# Patient Record
Sex: FEMALE | Race: WHITE | Employment: FULL TIME | URBAN - METROPOLITAN AREA
[De-identification: names, ages, dates, MRNs, and addresses within clinical notes are randomized per-mention and may not be internally consistent; named-entity substitution may affect disease eponyms.]

---

## 2017-08-02 ENCOUNTER — OFFICE VISIT (OUTPATIENT)
Dept: SURGERY | Age: 57
End: 2017-08-02

## 2017-08-02 VITALS
OXYGEN SATURATION: 98 % | RESPIRATION RATE: 14 BRPM | BODY MASS INDEX: 27.66 KG/M2 | SYSTOLIC BLOOD PRESSURE: 116 MMHG | DIASTOLIC BLOOD PRESSURE: 76 MMHG | WEIGHT: 166 LBS | HEART RATE: 78 BPM | HEIGHT: 65 IN | TEMPERATURE: 97.8 F

## 2017-08-02 DIAGNOSIS — R93.89 ENDOMETRIAL THICKENING ON ULTRA SOUND: Primary | ICD-10-CM

## 2017-08-02 DIAGNOSIS — N95.9 MENOPAUSAL AND POSTMENOPAUSAL DISORDER: ICD-10-CM

## 2017-08-02 DIAGNOSIS — N83.201 RIGHT OVARIAN CYST: ICD-10-CM

## 2017-08-02 NOTE — MR AVS SNAPSHOT
Visit Information Date & Time Provider Department Dept. Phone Encounter #  
 8/2/2017  2:30 PM Melvin Bernal, [address] [facility] [phone] [id number] Follow-up Instructions Return if symptoms worsen or fail to improve. Upcoming Health Maintenance Date Due Hepatitis C Screening 1960 DTaP/Tdap/Td series (1 - Tdap) 12/25/1983 PAP AKA CERVICAL CYTOLOGY 12/25/1983 BREAST CANCER SCRN MAMMOGRAM 12/25/2012 FOBT Q 1 YEAR AGE 50-75 12/25/2012 INFLUENZA AGE 9 TO ADULT 8/1/2017 Allergies as of 8/2/2017  Review Complete On: 8/2/2017 By: Melvin Bernal MD  
  
 Severity Noted Reaction Type Reactions Pcn [Penicillins]  08/02/2017    Rash Current Immunizations  Never Reviewed No immunizations on file. Not reviewed this visit You Were Diagnosed With   
  
 Codes Comments Endometrial thickening on ultra sound    -  Primary ICD-10-CM: R93.8 ICD-9-CM: 793.5 Right ovarian cyst     ICD-10-CM: L64.588 ICD-9-CM: 620.2 Menopausal and postmenopausal disorder     ICD-10-CM: N95.9 ICD-9-CM: 627.9 Vitals BP Pulse Temp Resp Height(growth percentile) Weight(growth percentile) 116/76 78 97.8 °F (36.6 °C) (Oral) 14 5' 5\" (1.651 m) 166 lb (75.3 kg) SpO2 BMI OB Status Smoking Status 98% 27.62 kg/m2 Perimenopausal Never Smoker Vitals History BMI and BSA Data Body Mass Index Body Surface Area  
 27.62 kg/m 2 1.86 m 2 Preferred Pharmacy Pharmacy Name Phone [facility] [address] [facility], [address] AT [address] [place] [address], Isabel Daier [phone] Your Updated Medication List  
  
Notice  As of 8/2/2017  3:05 PM  
 You have not been prescribed any medications. We Performed the Following OVARIAN MALIGNANCY RISK-BRIGITTE [[id number] Custom] Follow-up Instructions Return if symptoms worsen or fail to improve. Introducing [facility] & HEALTH SERVICES! [facility] introduces [employer] patient portal. Now you can access parts of your medical record, email your doctor's office, and request medication refills online. 1. In your internet browser, go to [email or web address] 2. Click on the First Time User? Click Here link in the Sign In box. You will see the New Member Sign Up page. 3. Enter your [employer] Access Code exactly as it appears below. You will not need to use this code after youve completed the sign-up process. If you do not sign up before the expiration date, you must request a new code. · [employer] Access Code: [email or web address] Expires: 10/31/2017  3:05 PM 
 
4. Enter the last four digits of your Social Security Number (xxxx) and Date of Birth (mm/dd/yyyy) as indicated and click Submit. You will be taken to the next sign-up page. 5. Create a [employer] ID. This will be your [employer] login ID and cannot be changed, so think of one that is secure and easy to remember. 6. Create a [employer] password. You can change your password at any time. 7. Enter your Password Reset Question and Answer. This can be used at a later time if you forget your password. 8. Enter your e-mail address. You will receive e-mail notification when new information is available in [address] 9. Click Sign Up. You can now view and download portions of your medical record. 10. Click the Download Summary menu link to download a portable copy of your medical information. If you have questions, please visit the Frequently Asked Questions section of the [employer] website. Remember, [employer] is NOT to be used for urgent needs. For medical emergencies, dial 911. Now available from your iPhone and Android! Please provide this summary of care documentation to your next provider. If you have any questions after today's visit, please call [phone].

## 2017-08-02 NOTE — PROGRESS NOTES
Gynecology Consult  Referred by     Name: Tony Braxton MRN: [id number] SSN: [id number]    YOB: 1960  Age: 49 y.o. Sex: female       Subjective:      Chief complaint:  Pelvic discomfort and endometrial thickening by pelvic US. Theodore Davenport is a 49 y.o.  female, [id number] (Abortions 0, Miscarriages 2), with a history of recent pelvic US for pressure sensation in pelvis and small right ovarian cyst noted, about 2 cm and endometrial thickening noted with endometrium measuring 1.7 cm. Has not experienced any bleeding at all. Previous treatment measures include none. Symptom onset has been acute for a time period of 10-14 day(s). Severity is described as mild. No LMP recorded. Patient is not currently having periods (Reason: Perimenopausal). The current method of family planning is none. Allergies   Allergen Reactions    Pcn [Penicillins] Rash     History reviewed. No pertinent past medical history. History reviewed. No pertinent surgical history. No current outpatient prescriptions on file. No current facility-administered medications for this visit. Family History   Problem Relation Age of Onset    Arrhythmia Mother      atrial fibrillation    Macular Degen Mother     Cancer Father      Lymphoma         Review of Systems:  Constitutional: No weight change, chills or fever, anorexia, weakness or sleep disturbance . Cardiovascular: No chest pain, shortness of breath, or palpitations . Respiratory: No cough, shortness of breath, hemoptysis, or orthopnea . Neurologic: No syncope, headaches or seizures . Hematologic: No easy bruising or unusual bleeding . Psychiatric: No insomnia, confusion, depression, or anxiety . GI:No nausea and vomiting, diarrhea or constipation  . : See HPI . Musculoskeletal: No joint pain or muscle pain . Endocrine: No polydipsia, polyuria, cold intolerance, excessive fatigue, or sleep disturbance .  Integumentary: No breast pain, lumps, nipple discharge, or axillary lumps . Objective:     Vitals:    08/02/17 1421   BP: 116/76   Pulse: 78   Resp: 14   Temp: 97.8 °F (36.6 °C)   TempSrc: Oral   SpO2: 98%   Weight: 166 lb (75.3 kg)   Height: 5' 5\" (1.651 m)       General:  alert, cooperative, no distress, appears stated age   Skin:  no rash or abnormalities   Eyes: negative   Mouth: MMM no lesions   Lymph Nodes:  Cervical, supraclavicular, and axillary nodes normal.   Breast Exam: normal appearance, no masses or tenderness    Lungs:  clear to auscultation bilaterally   Heart:  regular rate and rhythm   Abdomen: soft, non-tender. Bowel sounds normal. No masses,  no organomegaly   Back:  Costovertebral angle tenderness absent   Genitourinary: VULVA: normal appearing vulva with no masses, tenderness or lesions, VAGINA: normal appearing vagina with normal color and discharge, no lesions, PELVIC FLOOR EXAM: no cystocele, rectocele or prolapse noted, CERVIX: normal appearing cervix without discharge or lesions, UTERUS: uterus is normal size, shape, consistency and nontender, ADNEXA: normal adnexa in size, nontender and no masses. Extremities:  extremities normal, atraumatic, no cyanosis or edema   Neurologic:  sensation grossly intact. Psychiatric:  non focal     Assessment:       ICD-10-CM ICD-9-CM    1. Endometrial thickening on ultra sound R93.8 793.5    2. Right ovarian cyst N83.201 620.2 OVARIAN MALIGNANCY RISK-BRIGITTE   3. Menopausal and postmenopausal disorder N95.9 627.9        Plan:     Schedule for Hysteroscopy and Myosure D&C. Patient education literature given, which covered the procedure with the associated risks, which include but are not limited to the risk of anesthesia, risk of damage to the bowel, bladder, ureters, nerves and vessels. Reasonable alternative methods of therapy and  risks were also discussed. She understands and wants to proceed.  Written bowel prep instructions given    Follow-up Disposition:  Return if symptoms worsen or fail to improve.     Signed By:  Panchito Madrigal MD     August 2, 2017

## 2017-08-03 LAB
CANCER AG125 SERPL-ACNC: 16.7 U/ML (ref 0–38.1)
COMMENT: NORMAL
HE4 SERPL-SCNC: 48.6 PMOL/L (ref 0–105.2)
Lab: NORMAL
POSTMENOPAUSAL INTERP LOW, 140034: NORMAL
ROMA SCORE POSTMEN SERPL: 1.2
ROMA SCORE PREMEN SERPL: 0.7

## 2017-08-14 ENCOUNTER — HOSPITAL ENCOUNTER (OUTPATIENT)
Dept: PREADMISSION TESTING | Age: 57
Discharge: HOME | End: 2017-08-14
Attending: OBSTETRICS & GYNECOLOGY
Payer: COMMERCIAL

## 2017-08-14 VITALS
WEIGHT: 169.09 LBS | RESPIRATION RATE: 16 BRPM | HEIGHT: 65 IN | DIASTOLIC BLOOD PRESSURE: 71 MMHG | HEART RATE: 68 BPM | TEMPERATURE: 98.4 F | SYSTOLIC BLOOD PRESSURE: 119 MMHG | BODY MASS INDEX: 28.17 KG/M2 | OXYGEN SATURATION: 98 %

## 2017-08-14 LAB
ANION GAP BLD CALC-SCNC: 3 MMOL/L (ref 5–15)
APPEARANCE UR: ABNORMAL
BACTERIA URNS QL MICRO: NEGATIVE /HPF
BILIRUB UR QL: NEGATIVE
BUN SERPL-MCNC: 14 MG/DL (ref 6–20)
BUN/CREAT SERPL: 17 (ref 12–20)
CALCIUM SERPL-MCNC: 8.6 MG/DL (ref 8.5–10.1)
CHLORIDE SERPL-SCNC: 104 MMOL/L (ref 97–108)
CO2 SERPL-SCNC: 29 MMOL/L (ref 21–32)
COLOR UR: ABNORMAL
CREAT SERPL-MCNC: 0.83 MG/DL (ref 0.55–1.02)
EPITH CASTS URNS QL MICRO: ABNORMAL /LPF
ERYTHROCYTE [DISTWIDTH] IN BLOOD BY AUTOMATED COUNT: 13.1 % (ref 11.5–14.5)
GLUCOSE SERPL-MCNC: 102 MG/DL (ref 65–100)
GLUCOSE UR STRIP.AUTO-MCNC: NEGATIVE MG/DL
HCT VFR BLD AUTO: 38.4 % (ref 35–47)
HGB BLD-MCNC: 12.5 G/DL (ref 11.5–16)
HGB UR QL STRIP: ABNORMAL
HYALINE CASTS URNS QL MICRO: ABNORMAL /LPF (ref 0–5)
KETONES UR QL STRIP.AUTO: NEGATIVE MG/DL
LEUKOCYTE ESTERASE UR QL STRIP.AUTO: ABNORMAL
MCH RBC QN AUTO: 29.2 PG (ref 26–34)
MCHC RBC AUTO-ENTMCNC: 32.6 G/DL (ref 30–36.5)
MCV RBC AUTO: 89.7 FL (ref 80–99)
NITRITE UR QL STRIP.AUTO: NEGATIVE
PH UR STRIP: 6 [PH] (ref 5–8)
PLATELET # BLD AUTO: 210 K/UL (ref 150–400)
POTASSIUM SERPL-SCNC: 3.8 MMOL/L (ref 3.5–5.1)
PROT UR STRIP-MCNC: NEGATIVE MG/DL
RBC # BLD AUTO: 4.28 M/UL (ref 3.8–5.2)
RBC #/AREA URNS HPF: ABNORMAL /HPF (ref 0–5)
SODIUM SERPL-SCNC: 136 MMOL/L (ref 136–145)
SP GR UR REFRACTOMETRY: 1.02 (ref 1–1.03)
UA: UC IF INDICATED,UAUC: ABNORMAL
UROBILINOGEN UR QL STRIP.AUTO: 0.2 EU/DL (ref 0.2–1)
WBC # BLD AUTO: 4.3 K/UL (ref 3.6–11)
WBC URNS QL MICRO: ABNORMAL /HPF (ref 0–4)

## 2017-08-14 PROCEDURE — 85027 COMPLETE CBC AUTOMATED: CPT | Performed by: OBSTETRICS & GYNECOLOGY

## 2017-08-14 PROCEDURE — 36415 COLL VENOUS BLD VENIPUNCTURE: CPT | Performed by: OBSTETRICS & GYNECOLOGY

## 2017-08-14 PROCEDURE — 80048 BASIC METABOLIC PNL TOTAL CA: CPT | Performed by: OBSTETRICS & GYNECOLOGY

## 2017-08-14 PROCEDURE — 87086 URINE CULTURE/COLONY COUNT: CPT | Performed by: OBSTETRICS & GYNECOLOGY

## 2017-08-14 PROCEDURE — 81001 URINALYSIS AUTO W/SCOPE: CPT | Performed by: OBSTETRICS & GYNECOLOGY

## 2017-08-14 RX ORDER — SODIUM CHLORIDE, SODIUM LACTATE, POTASSIUM CHLORIDE, CALCIUM CHLORIDE 600; 310; 30; 20 MG/100ML; MG/100ML; MG/100ML; MG/100ML
25 INJECTION, SOLUTION INTRAVENOUS CONTINUOUS
Status: CANCELLED | OUTPATIENT
Start: 2017-08-21

## 2017-08-14 RX ORDER — METRONIDAZOLE 500 MG/100ML
500 INJECTION, SOLUTION INTRAVENOUS ONCE
Status: CANCELLED | OUTPATIENT
Start: 2017-08-21 | End: 2017-08-21

## 2017-08-14 RX ORDER — LEVOFLOXACIN 5 MG/ML
500 INJECTION, SOLUTION INTRAVENOUS ONCE
Status: CANCELLED | OUTPATIENT
Start: 2017-08-21 | End: 2017-08-21

## 2017-08-16 LAB
BACTERIA SPEC CULT: NORMAL
CC UR VC: NORMAL
SERVICE CMNT-IMP: NORMAL

## 2017-08-16 NOTE — PERIOP NOTES
UA CX Results faxed to Dr Chris Campbell for evaluation, ? [place]. Confirmation fax received.  Tristian XIONG

## 2017-08-17 NOTE — PERIOP NOTES
Called Dr Kameron Estrada office to FU on UA CX Results previously faxed. I spoke with Aurora Genao, she stated \"Dr Roseanne Lang is not in the office today but will fax back to you after Dr Roseanne Lang evaluates the UA CX Results\".  Tristian XIONG

## 2017-08-17 NOTE — PERIOP NOTES
Dr Brady Servant faxed regarding previously faxed UA CX Results, stated \"No Treatment\".  Yennifer Joseph RN

## 2017-08-21 ENCOUNTER — ANESTHESIA (OUTPATIENT)
Dept: SURGERY | Age: 57
End: 2017-08-21
Payer: COMMERCIAL

## 2017-08-21 ENCOUNTER — HOSPITAL ENCOUNTER (OUTPATIENT)
Age: 57
Setting detail: OUTPATIENT SURGERY
Discharge: HOME | End: 2017-08-21
Attending: OBSTETRICS & GYNECOLOGY | Admitting: OBSTETRICS & GYNECOLOGY
Payer: COMMERCIAL

## 2017-08-21 ENCOUNTER — ANESTHESIA EVENT (OUTPATIENT)
Dept: SURGERY | Age: 57
End: 2017-08-21
Payer: COMMERCIAL

## 2017-08-21 VITALS
RESPIRATION RATE: 21 BRPM | OXYGEN SATURATION: 100 % | SYSTOLIC BLOOD PRESSURE: 122 MMHG | HEIGHT: 65 IN | BODY MASS INDEX: 27.55 KG/M2 | WEIGHT: 165.34 LBS | TEMPERATURE: 97.7 F | HEART RATE: 59 BPM | DIASTOLIC BLOOD PRESSURE: 72 MMHG

## 2017-08-21 PROCEDURE — 76060000032 HC ANESTHESIA 0.5 TO 1 HR: Performed by: OBSTETRICS & GYNECOLOGY

## 2017-08-21 PROCEDURE — 76210000020 HC REC RM PH II FIRST 0.5 HR: Performed by: OBSTETRICS & GYNECOLOGY

## 2017-08-21 PROCEDURE — 74011000250 HC RX REV CODE- 250

## 2017-08-21 PROCEDURE — 74011250636 HC RX REV CODE- 250/636: Performed by: OBSTETRICS & GYNECOLOGY

## 2017-08-21 PROCEDURE — 74011250636 HC RX REV CODE- 250/636

## 2017-08-21 PROCEDURE — 77030018836 HC SOL IRR NACL ICUM -A: Performed by: OBSTETRICS & GYNECOLOGY

## 2017-08-21 PROCEDURE — 77030011640 HC PAD GRND REM COVD -A: Performed by: OBSTETRICS & GYNECOLOGY

## 2017-08-21 PROCEDURE — 77030033136 HC TBNG INFLO AQUILEX ST HOLO -C: Performed by: OBSTETRICS & GYNECOLOGY

## 2017-08-21 PROCEDURE — 88305 TISSUE EXAM BY PATHOLOGIST: CPT | Performed by: OBSTETRICS & GYNECOLOGY

## 2017-08-21 PROCEDURE — 77030033137 HC TBNG OUTFLO AQUILEX ST HOLO -B: Performed by: OBSTETRICS & GYNECOLOGY

## 2017-08-21 PROCEDURE — 77030032490 HC SLV COMPR SCD KNE COVD -B: Performed by: OBSTETRICS & GYNECOLOGY

## 2017-08-21 PROCEDURE — 77030033135 HC DEV TISS RMVL HYSTSCP MYOSUR HOLO -G1: Performed by: OBSTETRICS & GYNECOLOGY

## 2017-08-21 PROCEDURE — 77030010509 HC AIRWY LMA MSK TELE -A: Performed by: ANESTHESIOLOGY

## 2017-08-21 PROCEDURE — 76210000006 HC OR PH I REC 0.5 TO 1 HR: Performed by: OBSTETRICS & GYNECOLOGY

## 2017-08-21 PROCEDURE — 74011000250 HC RX REV CODE- 250: Performed by: OBSTETRICS & GYNECOLOGY

## 2017-08-21 PROCEDURE — 77030005537 HC CATH URETH BARD -A: Performed by: OBSTETRICS & GYNECOLOGY

## 2017-08-21 PROCEDURE — 74011250636 HC RX REV CODE- 250/636: Performed by: ANESTHESIOLOGY

## 2017-08-21 PROCEDURE — 76010000138 HC OR TIME 0.5 TO 1 HR: Performed by: OBSTETRICS & GYNECOLOGY

## 2017-08-21 RX ORDER — PROPOFOL 10 MG/ML
INJECTION, EMULSION INTRAVENOUS AS NEEDED
Status: DISCONTINUED | OUTPATIENT
Start: 2017-08-21 | End: 2017-08-21 | Stop reason: HOSPADM

## 2017-08-21 RX ORDER — DEXAMETHASONE SODIUM PHOSPHATE 4 MG/ML
INJECTION, SOLUTION INTRA-ARTICULAR; INTRALESIONAL; INTRAMUSCULAR; INTRAVENOUS; SOFT TISSUE AS NEEDED
Status: DISCONTINUED | OUTPATIENT
Start: 2017-08-21 | End: 2017-08-21 | Stop reason: HOSPADM

## 2017-08-21 RX ORDER — FENTANYL CITRATE 50 UG/ML
50 INJECTION, SOLUTION INTRAMUSCULAR; INTRAVENOUS AS NEEDED
Status: DISCONTINUED | OUTPATIENT
Start: 2017-08-21 | End: 2017-08-21 | Stop reason: HOSPADM

## 2017-08-21 RX ORDER — MIDAZOLAM HYDROCHLORIDE 1 MG/ML
0.5 INJECTION, SOLUTION INTRAMUSCULAR; INTRAVENOUS
Status: DISCONTINUED | OUTPATIENT
Start: 2017-08-21 | End: 2017-08-21 | Stop reason: HOSPADM

## 2017-08-21 RX ORDER — ONDANSETRON 2 MG/ML
INJECTION INTRAMUSCULAR; INTRAVENOUS AS NEEDED
Status: DISCONTINUED | OUTPATIENT
Start: 2017-08-21 | End: 2017-08-21 | Stop reason: HOSPADM

## 2017-08-21 RX ORDER — OXYCODONE AND ACETAMINOPHEN 7.5; 325 MG/1; MG/1
1 TABLET ORAL
Qty: 30 TAB | Refills: 0 | Status: SHIPPED | OUTPATIENT
Start: 2017-08-21 | End: 2017-09-29

## 2017-08-21 RX ORDER — FENTANYL CITRATE 50 UG/ML
25 INJECTION, SOLUTION INTRAMUSCULAR; INTRAVENOUS
Status: DISCONTINUED | OUTPATIENT
Start: 2017-08-21 | End: 2017-08-21 | Stop reason: HOSPADM

## 2017-08-21 RX ORDER — SODIUM CHLORIDE 0.9 % (FLUSH) 0.9 %
5-10 SYRINGE (ML) INJECTION AS NEEDED
Status: DISCONTINUED | OUTPATIENT
Start: 2017-08-21 | End: 2017-08-21 | Stop reason: HOSPADM

## 2017-08-21 RX ORDER — ONDANSETRON 2 MG/ML
4 INJECTION INTRAMUSCULAR; INTRAVENOUS AS NEEDED
Status: DISCONTINUED | OUTPATIENT
Start: 2017-08-21 | End: 2017-08-21 | Stop reason: HOSPADM

## 2017-08-21 RX ORDER — MIDAZOLAM HYDROCHLORIDE 1 MG/ML
INJECTION, SOLUTION INTRAMUSCULAR; INTRAVENOUS AS NEEDED
Status: DISCONTINUED | OUTPATIENT
Start: 2017-08-21 | End: 2017-08-21 | Stop reason: HOSPADM

## 2017-08-21 RX ORDER — LIDOCAINE HYDROCHLORIDE 10 MG/ML
0.1 INJECTION, SOLUTION EPIDURAL; INFILTRATION; INTRACAUDAL; PERINEURAL AS NEEDED
Status: DISCONTINUED | OUTPATIENT
Start: 2017-08-21 | End: 2017-08-21 | Stop reason: HOSPADM

## 2017-08-21 RX ORDER — LEVOFLOXACIN 5 MG/ML
500 INJECTION, SOLUTION INTRAVENOUS ONCE
Status: COMPLETED | OUTPATIENT
Start: 2017-08-21 | End: 2017-08-21

## 2017-08-21 RX ORDER — METRONIDAZOLE 500 MG/100ML
500 INJECTION, SOLUTION INTRAVENOUS ONCE
Status: COMPLETED | OUTPATIENT
Start: 2017-08-21 | End: 2017-08-21

## 2017-08-21 RX ORDER — SODIUM CHLORIDE, SODIUM LACTATE, POTASSIUM CHLORIDE, CALCIUM CHLORIDE 600; 310; 30; 20 MG/100ML; MG/100ML; MG/100ML; MG/100ML
25 INJECTION, SOLUTION INTRAVENOUS CONTINUOUS
Status: DISCONTINUED | OUTPATIENT
Start: 2017-08-21 | End: 2017-08-21 | Stop reason: HOSPADM

## 2017-08-21 RX ORDER — HYDROMORPHONE HYDROCHLORIDE 1 MG/ML
.2-.5 INJECTION, SOLUTION INTRAMUSCULAR; INTRAVENOUS; SUBCUTANEOUS
Status: DISCONTINUED | OUTPATIENT
Start: 2017-08-21 | End: 2017-08-21 | Stop reason: HOSPADM

## 2017-08-21 RX ORDER — FENTANYL CITRATE 50 UG/ML
INJECTION, SOLUTION INTRAMUSCULAR; INTRAVENOUS AS NEEDED
Status: DISCONTINUED | OUTPATIENT
Start: 2017-08-21 | End: 2017-08-21 | Stop reason: HOSPADM

## 2017-08-21 RX ORDER — DIPHENHYDRAMINE HYDROCHLORIDE 50 MG/ML
12.5 INJECTION, SOLUTION INTRAMUSCULAR; INTRAVENOUS AS NEEDED
Status: DISCONTINUED | OUTPATIENT
Start: 2017-08-21 | End: 2017-08-21 | Stop reason: HOSPADM

## 2017-08-21 RX ORDER — MORPHINE SULFATE 10 MG/ML
2 INJECTION, SOLUTION INTRAMUSCULAR; INTRAVENOUS
Status: DISCONTINUED | OUTPATIENT
Start: 2017-08-21 | End: 2017-08-21 | Stop reason: HOSPADM

## 2017-08-21 RX ORDER — LIDOCAINE HYDROCHLORIDE 20 MG/ML
INJECTION, SOLUTION EPIDURAL; INFILTRATION; INTRACAUDAL; PERINEURAL AS NEEDED
Status: DISCONTINUED | OUTPATIENT
Start: 2017-08-21 | End: 2017-08-21 | Stop reason: HOSPADM

## 2017-08-21 RX ADMIN — DEXAMETHASONE SODIUM PHOSPHATE 8 MG: 4 INJECTION, SOLUTION INTRA-ARTICULAR; INTRALESIONAL; INTRAMUSCULAR; INTRAVENOUS; SOFT TISSUE at 16:56

## 2017-08-21 RX ADMIN — METRONIDAZOLE 500 MG: 500 INJECTION, SOLUTION INTRAVENOUS at 16:57

## 2017-08-21 RX ADMIN — FENTANYL CITRATE 100 MCG: 50 INJECTION, SOLUTION INTRAMUSCULAR; INTRAVENOUS at 16:51

## 2017-08-21 RX ADMIN — MIDAZOLAM HYDROCHLORIDE 2 MG: 1 INJECTION, SOLUTION INTRAMUSCULAR; INTRAVENOUS at 16:46

## 2017-08-21 RX ADMIN — PROPOFOL 150 MG: 10 INJECTION, EMULSION INTRAVENOUS at 16:51

## 2017-08-21 RX ADMIN — LEVOFLOXACIN 500 MG: 5 INJECTION, SOLUTION INTRAVENOUS at 16:55

## 2017-08-21 RX ADMIN — ONDANSETRON 4 MG: 2 INJECTION INTRAMUSCULAR; INTRAVENOUS at 17:28

## 2017-08-21 RX ADMIN — SODIUM CHLORIDE, POTASSIUM CHLORIDE, SODIUM LACTATE AND CALCIUM CHLORIDE: 600; 310; 30; 20 INJECTION, SOLUTION INTRAVENOUS at 16:33

## 2017-08-21 RX ADMIN — LIDOCAINE HYDROCHLORIDE 60 MG: 20 INJECTION, SOLUTION EPIDURAL; INFILTRATION; INTRACAUDAL; PERINEURAL at 16:51

## 2017-08-21 NOTE — IP AVS SNAPSHOT
Summary of Care Report The Summary of Care report has been created to help improve care coordination. Users with access to Yo or [address] (Web-based application) may access additional patient information including the Discharge Summary. If you are not currently a [address] user and need more information, please call the number listed below in the [place] [address] section and ask to be connected with Medical Records. Facility Information Name Address Phone [address] [address] [phone] Patient Information Patient Name Sex  Princess Herr ([id number]) Female 1960 Discharge Information Admitting Provider Service Area Unit Mary Bernal MD / [phone] [address] [facility] / [phone] Discharge Provider Discharge Date/Time Discharge Disposition Destination (none) 2017 Evening (Pending) AHR (none) Patient Language Language ENGLISH [13] Hospital Problems as of 2017  Reviewed: 2017  5:54 PM by Mary Bernal MD  
 None Non-Hospital Problems as of 2017  Reviewed: 2017  5:54 PM by Mary Bernal MD  
  
  
  
 Class Noted - Resolved Last Modified Active Problems Endometrial thickening on ultra sound  2017 - Present 2017 by Mary Bernal MD  
  Entered by Mary Bernal MD  
  Right ovarian cyst  2017 - Present 2017 by Mary Bernal MD  
  Entered by Mary Bernal MD  
  Menopausal and postmenopausal disorder  2017 - Present 2017 by Mary Bernal MD  
  Entered by Mary Bernal MD  
  
You are allergic to the following Allergen Reactions Pcn (Penicillins) Rash Current Discharge Medication List  
  
START taking these medications Dose & Instructions Dispensing Information Comments oxyCODONE-acetaminophen 7.5-325 mg per tablet Commonly known as:  PERCOCET 7.5 Dose:  1 Tab Take 1 Tab by mouth every four (4) hours as needed for Pain. Max Daily Amount: 6 Tabs. Quantity:  30 Tab Refills:  0 Surgery Information ID Date/Time Status Primary Surgeon All Procedures Location [id number] 2017 [address] Unposted Marybeth Lazar MD HYSTEROSCOPY DILITATION AND CURETTAGE WITH MYOSURE [facility] MAIN OR Follow-up Information Follow up With Details Comments Contact Info Provider Unknown   Patient not available to ask Discharge Instructions Patient Discharge Instructions eKon Martinez / [id number] : 1960 Admitted 2017 Discharged: 2017 Take Home Medications · It is important that you take the medication exactly as they are prescribed. · Keep your medication in the bottles provided by the pharmacist and keep a list of the medication names, dosages, and times to be taken in your wallet. · Do not take other medications without consulting your doctor. No driving while taking pain medication. What to do at [facility] Recommended diet: Soft diet until normal bowel movements. No soft drinks. Drink  water frequently (Please). Minimal coffee and minimal tea. Recommended activity: Activity as tolerated and you may drive after 2 day(s). No heavy lifting until released to do so by Dr. Esthela Fritz, [place]. No sex, tampons, or douches for 3 weeks. Take showers for the next 3 weeks. Please do not stay in bed during the day; be up and about every day, without doing strenuous activity. It is very important that you not cross your legs, as it will make you more likely to have a blood clot in your leg (DVT), which can travel to your lungs and cause suffocation (pulmonary embolism or PE). Incisions: Do not apply lotion, powder or creams to incisions unless instructed to do so by Rashmi Davis.  Keep incisions as dry as possible, but it is okay to shower without scrubbing the incisions. If you experience any of the following symptoms: Heavy vaginal bleeding or temperature above 100.6 degrees, chest pain, or shortness of breath, please urgently follow up with  [facility], [place] by calling [phone] or calling answering service at [phone] to have him paged for an emergency. Follow-up appointment: in 2 weeks. Call [phone] to make appointment. Thank you for allowing me to take care of you!!  
 
UJAN Rueda Information obtained by : 
I understand that if any problems occur once I am at home I am to contact my physician. I understand and acknowledge receipt of the instructions indicated above. Physician's or R.N.'s Signature                                                                  Date/Time Patient or Representative Signature                                                          Date/Time Oxycodone/Acetaminophen (Percocet, Roxicet) - (By mouth) Why this medicine is used:  
Treats pain. This medicine contains a narcotic pain reliever. Contact a nurse or doctor right away if you have: 
· Extreme weakness, shallow breathing, slow heartbeat · Sweating or cold, clammy skin · Skin blisters, rash, or peeling Common side effects: 
· Constipation · Nausea, vomiting · Tiredness © 2017 [address] Information is for End User's use only and may not be sold, redistributed or otherwise used for commercial purposes. DISCHARGE SUMMARY from Nurse The following personal items are in your possession at time of discharge: 
 
Dental Appliances: None Visual Aid: Glasses (reading) Home Medications: None Jewelry: None Clothing: Other (comment) (street clothes) Other Valuables: Eyeglasses (in personal belongings bag) Personal Items Sent to Safe: none PATIENT INSTRUCTIONS: 
 
After general anesthesia or intravenous sedation, for 24 hours or while taking prescription Narcotics: · Limit your activities · Do not drive and operate hazardous machinery · Do not make important personal or business decisions · Do  not drink alcoholic beverages · If you have not urinated within 8 hours after discharge, please contact your surgeon on call. Report the following to your surgeon: 
· Excessive pain, swelling, redness or odor of or around the surgical area · Temperature over 100.5 · Nausea and vomiting lasting longer than 4 hours or if unable to take medications · Any signs of decreased circulation or nerve impairment to extremity: change in color, persistent  numbness, tingling, coldness or increase pain · Any questions These are general instructions for a healthy lifestyle: No smoking/ No tobacco products/ Avoid exposure to second hand smoke Surgeon General's Warning:  Quitting smoking now greatly reduces serious risk to your health. Obesity, smoking, and sedentary lifestyle greatly increases your risk for illness A healthy diet, regular physical exercise & weight monitoring are important for maintaining a healthy lifestyle You may be retaining fluid if you have a history of heart failure or if you experience any of the following symptoms:  Weight gain of 3 pounds or more overnight or 5 pounds in a week, increased swelling in our hands or feet or shortness of breath while lying flat in bed. Please call your doctor as soon as you notice any of these symptoms; do not wait until your next office visit. Recognize signs and symptoms of STROKE: 
 
F-face looks uneven A-arms unable to move or move unevenly S-speech slurred or non-existent T-time-call 911 as soon as signs and symptoms begin-DO NOT go Back to bed or wait to see if you get better-TIME IS BRAIN. Warning Signs of HEART ATTACK Call 911 if you have these symptoms: 
? Chest discomfort. Most heart attacks involve discomfort in the center of the chest that lasts more than a few minutes, or that goes away and comes back. It can feel like uncomfortable pressure, squeezing, fullness, or pain. ? Discomfort in other areas of the upper body. Symptoms can include pain or discomfort in one or both arms, the back, neck, jaw, or stomach. ? Shortness of breath with or without chest discomfort. ? Other signs may include breaking out in a cold sweat, nausea, or lightheadedness. Don't wait more than five minutes to call [address]! Fast action can save your life. Calling 911 is almost always the fastest way to get lifesaving treatment. Emergency Medical Services staff can begin treatment when they arrive  up to an hour sooner than if someone gets to the hospital by car. Chart Review Routing History No Routing History on File

## 2017-08-21 NOTE — INTERVAL H&P NOTE
H&P Update:  Elissa Jimenes was seen and examined. History and physical has been reviewed. The patient has been examined.  There have been no significant clinical changes since the completion of the originally dated History and Physical.    Signed By: Yusra Roman MD     August 21, 2017 4:07 PM

## 2017-08-21 NOTE — H&P (VIEW-ONLY)
Gynecology Consult  Referred by     Name: Daniel Damian MRN: [id number] SSN: [id number]    YOB: 1960  Age: 49 y.o. Sex: female       Subjective:      Chief complaint:  Pelvic discomfort and endometrial thickening by pelvic US. Jermaine Wasserman is a 49 y.o.  female, [id number] (Abortions 0, Miscarriages 2), with a history of recent pelvic US for pressure sensation in pelvis and small right ovarian cyst noted, about 2 cm and endometrial thickening noted with endometrium measuring 1.7 cm. Has not experienced any bleeding at all. Previous treatment measures include none. Symptom onset has been acute for a time period of 10-14 day(s). Severity is described as mild. No LMP recorded. Patient is not currently having periods (Reason: Perimenopausal). The current method of family planning is none. Allergies   Allergen Reactions    Pcn [Penicillins] Rash     History reviewed. No pertinent past medical history. History reviewed. No pertinent surgical history. No current outpatient prescriptions on file. No current facility-administered medications for this visit. Family History   Problem Relation Age of Onset    Arrhythmia Mother      atrial fibrillation    Macular Degen Mother     Cancer Father      Lymphoma         Review of Systems:  Constitutional: No weight change, chills or fever, anorexia, weakness or sleep disturbance . Cardiovascular: No chest pain, shortness of breath, or palpitations . Respiratory: No cough, shortness of breath, hemoptysis, or orthopnea . Neurologic: No syncope, headaches or seizures . Hematologic: No easy bruising or unusual bleeding . Psychiatric: No insomnia, confusion, depression, or anxiety . GI:No nausea and vomiting, diarrhea or constipation  . : See HPI . Musculoskeletal: No joint pain or muscle pain . Endocrine: No polydipsia, polyuria, cold intolerance, excessive fatigue, or sleep disturbance .  Integumentary: No breast pain, lumps, nipple discharge, or axillary lumps . Objective:     Vitals:    08/02/17 1421   BP: 116/76   Pulse: 78   Resp: 14   Temp: 97.8 °F (36.6 °C)   TempSrc: Oral   SpO2: 98%   Weight: 166 lb (75.3 kg)   Height: 5' 5\" (1.651 m)       General:  alert, cooperative, no distress, appears stated age   Skin:  no rash or abnormalities   Eyes: negative   Mouth: MMM no lesions   Lymph Nodes:  Cervical, supraclavicular, and axillary nodes normal.   Breast Exam: normal appearance, no masses or tenderness    Lungs:  clear to auscultation bilaterally   Heart:  regular rate and rhythm   Abdomen: soft, non-tender. Bowel sounds normal. No masses,  no organomegaly   Back:  Costovertebral angle tenderness absent   Genitourinary: VULVA: normal appearing vulva with no masses, tenderness or lesions, VAGINA: normal appearing vagina with normal color and discharge, no lesions, PELVIC FLOOR EXAM: no cystocele, rectocele or prolapse noted, CERVIX: normal appearing cervix without discharge or lesions, UTERUS: uterus is normal size, shape, consistency and nontender, ADNEXA: normal adnexa in size, nontender and no masses. Extremities:  extremities normal, atraumatic, no cyanosis or edema   Neurologic:  sensation grossly intact. Psychiatric:  non focal     Assessment:       ICD-10-CM ICD-9-CM    1. Endometrial thickening on ultra sound R93.8 793.5    2. Right ovarian cyst N83.201 620.2 OVARIAN MALIGNANCY RISK-BRIGITTE   3. Menopausal and postmenopausal disorder N95.9 627.9        Plan:     Schedule for Hysteroscopy and Myosure D&C. Patient education literature given, which covered the procedure with the associated risks, which include but are not limited to the risk of anesthesia, risk of damage to the bowel, bladder, ureters, nerves and vessels. Reasonable alternative methods of therapy and  risks were also discussed. She understands and wants to proceed.  Written bowel prep instructions given    Follow-up Disposition:  Return if symptoms worsen or fail to improve.     Signed By:  Daniel Hoover MD     August 2, 2017

## 2017-08-21 NOTE — OP NOTES
[address] PROCEDURE    Name: Abeba Cazares Record Number: [id number]   YOB: 1960    DATE OF PROCEDURE: 8/21/2017    PREOPERATIVE DIAGNOSIS: ENDOMETRIAL THICKENING     POSTOPERATIVE DIAGNOSIS: LARGE ENDOMETRIAL POLYP VS LARGE INTRACAVITARY FIBROID. PROCEDURE: Hysteroscopy, Dilatation and Myosure Curettage with endometrial polypectomy vs myomectomy (pending path report). SURGEON:  Alfred Alexander MD    ASSISTANT:  None    ANESTHESIA: General    EBL:Minimal cc    FLUID DEFICIT: 430 cc    FINDINGS: Large intracavitary vs large endometrial polyp (pending path report) emanating from a stalk on right lower uterine segment, posteriorly. Rest of cavity was normal.     PROCEDURE IN DETAIL: The patient was placed on the Operating Room table in the supine position. Time out was done to confirm the operating procedure, surgeon, patient and site. Once confirmed by the team, procedure was started. The patient underwent general endotracheal anesthesia and was repositioned in the dorsal lithotomy position, prepped and draped in the usual sterile fashion for vaginal surgery. The cervix was exposed with a weighted vaginal speculum. It sounded to 8 cm and dilated to 23 [place]. Diagnostic hysteroscope was introduced into the endometrial cavity using saline. The hysteroscopic evaluation revealed: Large intracavitary vs large endometrial polyp (pending path report) emanating from a stalk on right lower uterine segment, posteriorly. Rest of cavity was normal. The Myosure device was then inserted and endometrial shaver was then aligned and the mass was completely removed in its entirety. The endometrial cavity appeared normal following the procedure. Following the case, the hysteroscope and Myosure device were removed. The single-tooth tenaculum was removed from the cervix. Hernostasis was assured. The patient went to the Recovery Room in satisfactory condition.   All counts were correct.     Signed By: Betha Councilman, MD

## 2017-08-21 NOTE — DISCHARGE INSTRUCTIONS
Patient Discharge Instructions    Ian De La Fuente / [id number] : 1960    Admitted 2017 Discharged: 2017     Take Home Medications            · It is important that you take the medication exactly as they are prescribed. · Keep your medication in the bottles provided by the pharmacist and keep a list of the medication names, dosages, and times to be taken in your wallet. · Do not take other medications without consulting your doctor. No driving while taking pain medication. What to do at Home    Recommended diet: Soft diet until normal bowel movements. No soft drinks. Drink  water frequently (Please). Minimal coffee and minimal tea. Recommended activity: Activity as tolerated and you may drive after 2 day(s). No heavy lifting until released to do so by Dr. Jagdeep Gonzalez. No sex, tampons, or douches for 3 weeks. Take showers for the next 3 weeks. Please do not stay in bed during the day; be up and about every day, without doing strenuous activity. It is very important that you not cross your legs, as it will make you more likely to have a blood clot in your leg (DVT), which can travel to your lungs and cause suffocation (pulmonary embolism or PE). Incisions: Do not apply lotion, powder or creams to incisions unless instructed to do so by Dr. Jagdeep Gonzalez. Keep incisions as dry as possible, but it is okay to shower without scrubbing the incisions. If you experience any of the following symptoms: Heavy vaginal bleeding or temperature above 100.6 degrees, chest pain, or shortness of breath, please urgently follow up with Dr. Jagdeep Gonzalez by calling [phone] or calling answering service at [phone] to have him paged for an emergency. Follow-up appointment: in 2 weeks. Call [phone] to make appointment. Thank you for allowing me to take care of you!!     JUAN Salmon.             Information obtained by :  I understand that if any problems occur once I am at home I am to contact my physician. I understand and acknowledge receipt of the instructions indicated above. Physician's or R.N.'s Signature                                                                  Date/Time                                                                                                                                              Patient or Representative Signature                                                          Date/Time       Oxycodone/Acetaminophen (Percocet, Roxicet) - (By mouth)   Why this medicine is used:   Treats pain. This medicine contains a narcotic pain reliever. Contact a nurse or doctor right away if you have:  · Extreme weakness, shallow breathing, slow heartbeat  · Sweating or cold, clammy skin  · Skin blisters, rash, or peeling     Common side effects:  · Constipation  · Nausea, vomiting  · Tiredness  © 2017 [employer]  Information is for End User's use only and may not be sold, redistributed or otherwise used for commercial purposes. DISCHARGE SUMMARY from Nurse    The following personal items are in your possession at time of discharge:    Dental Appliances: None  Visual Aid: Glasses (reading)     Home Medications: None  Jewelry: None  Clothing: Other (comment) (street clothes)  Other Valuables: Eyeglasses (in personal belongings bag)  Personal Items Sent to Safe: none          PATIENT INSTRUCTIONS:    After general anesthesia or intravenous sedation, for 24 hours or while taking prescription Narcotics:  · Limit your activities  · Do not drive and operate hazardous machinery  · Do not make important personal or business decisions  · Do  not drink alcoholic beverages  · If you have not urinated within 8 hours after discharge, please contact your surgeon on call.     Report the following to your surgeon:  · Excessive pain, swelling, redness or odor of or around the surgical area  · Temperature over 100.5  · Nausea and vomiting lasting longer than 4 hours or if unable to take medications  · Any signs of decreased circulation or nerve impairment to extremity: change in color, persistent  numbness, tingling, coldness or increase pain  · Any questions        These are general instructions for a healthy lifestyle:    No smoking/ No tobacco products/ Avoid exposure to second hand smoke    Surgeon General's Warning:  Quitting smoking now greatly reduces serious risk to your health. Obesity, smoking, and sedentary lifestyle greatly increases your risk for illness    A healthy diet, regular physical exercise & weight monitoring are important for maintaining a healthy lifestyle    You may be retaining fluid if you have a history of heart failure or if you experience any of the following symptoms:  Weight gain of 3 pounds or more overnight or 5 pounds in a week, increased swelling in our hands or feet or shortness of breath while lying flat in bed. Please call your doctor as soon as you notice any of these symptoms; do not wait until your next office visit. Recognize signs and symptoms of STROKE:    F-face looks uneven    A-arms unable to move or move unevenly    S-speech slurred or non-existent    T-time-call 911 as soon as signs and symptoms begin-DO NOT go       Back to bed or wait to see if you get better-TIME IS BRAIN. Warning Signs of HEART ATTACK     Call 911 if you have these symptoms:   Chest discomfort. Most heart attacks involve discomfort in the center of the chest that lasts more than a few minutes, or that goes away and comes back. It can feel like uncomfortable pressure, squeezing, fullness, or pain.  Discomfort in other areas of the upper body. Symptoms can include pain or discomfort in one or both arms, the back, neck, jaw, or stomach.  Shortness of breath with or without chest discomfort.    Other signs may include breaking out in a cold sweat, nausea, or lightheadedness. Don't wait more than five minutes to call 911 - MINUTES MATTER! Fast action can save your life. Calling 911 is almost always the fastest way to get lifesaving treatment. Emergency Medical Services staff can begin treatment when they arrive -- up to an hour sooner than if someone gets to the hospital by car.

## 2017-08-21 NOTE — ANESTHESIA POSTPROCEDURE EVALUATION
Post-Anesthesia Evaluation and Assessment    Patient: Keon Martinez MRN: [id number]  SSN: [id number]    YOB: 1960  Age: 49 y.o. Sex: female       Cardiovascular Function/Vital Signs  Visit Vitals    /75    Pulse 72    Temp 36.4 °C (97.6 °F)    Resp 18    Ht 5' 5\" (1.651 m)    Wt 75 kg (165 lb 5.5 oz)    SpO2 100%    BMI 27.51 kg/m2       Patient is status post general anesthesia for Procedure(s): HYSTEROSCOPY DILITATION AND CURETTAGE WITH MYOSURE. Nausea/Vomiting: None    Postoperative hydration reviewed and adequate. Pain:  Pain Scale 1: Numeric (0 - 10) (08/21/17 1755)  Pain Intensity 1: 0 (08/21/17 1755)   Managed    Neurological Status:   Neuro (WDL): Exceptions to WDL (08/21/17 1746)  Neuro  Neurologic State: Drowsy (08/21/17 1746)  Orientation Level: Oriented X4 (08/21/17 1746)  Cognition: Follows commands (08/21/17 1746)  Speech: Clear (08/21/17 1746)  LUE Motor Response: Purposeful (08/21/17 1746)  LLE Motor Response: Purposeful (08/21/17 1746)  RUE Motor Response: Purposeful (08/21/17 1746)  RLE Motor Response: Purposeful (08/21/17 1746)   At baseline    Mental Status and Level of Consciousness: Arousable    Pulmonary Status:   O2 Device: Nasal cannula (08/21/17 1800)   Adequate oxygenation and airway patent    Complications related to anesthesia: None    Post-anesthesia assessment completed.  No concerns    Signed By: Brando Apodaca MD     August 21, 2017

## 2017-08-21 NOTE — PERIOP NOTES
1459:  Dr. Nanci Garrett in to see the patient & discuss the delay. Then patient was assisted OOB to [facility].    1511:  Patient's  at her side now    1:  Patient resting in bed with her  at her side. She denies the need for anything at this time.     1610:  Dr. Nanci Garrett in to see the patient

## 2017-08-21 NOTE — BRIEF OP NOTE
BRIEF OPERATIVE NOTE    Date of Procedure: 8/21/2017   Preoperative Diagnosis: ENDOMETRIAL THICKENING  Postoperative Diagnosis: LARGE ENDOMETRIAL POLYP VS LARGE INTRACAVITARY FIBROID. Procedure(s): HYSTEROSCOPY DILATATION AND CURETTAGE AND MYOMECTOMY VS POLYPECTOMY WITH MYOSURE  Surgeon(s) and Role:     * Paige Han MD - Primary         Assistant Staff:       Surgical Staff:  Circ-1: Jose Jennings RN  Scrub Tech-1: Indu Angulo  Event Time In   Incision Start 1715   Incision Close 1736     Anesthesia: General   Estimated Blood Loss: Minimal.  Specimens:   ID Type Source Tests Collected by Time Destination   1 : ENDOMETRIAL SHAVINGS Preservative Endometrial  Paige Han MD 8/21/2017 1729 Pathology      Findings: Large intracavitary vs large endometrial polyp (pending path report) emanating from a stalk on right lower uterine segment, posteriorly. Rest of cavity was normal.   Complications: None.   Implants: * No implants in log *

## 2017-08-21 NOTE — ANESTHESIA PREPROCEDURE EVALUATION
Anesthetic History   No history of anesthetic complications            Review of Systems / Medical History  Patient summary reviewed, nursing notes reviewed and pertinent labs reviewed    Pulmonary  Within defined limits                 Neuro/Psych   Within defined limits           Cardiovascular  Within defined limits                Exercise tolerance: >4 METS     GI/Hepatic/Renal                Endo/Other             Other Findings   Comments: ENDOMETRIAL THICKENING  Right ovarian cyst  Menopausal and postmenopausal disorder           Physical Exam    Airway  Mallampati: I    Neck ROM: normal range of motion   Mouth opening: Normal     Cardiovascular  Regular rate and rhythm,  S1 and S2 normal,  no murmur, click, rub, or gallop             Dental  No notable dental hx       Pulmonary  Breath sounds clear to auscultation               Abdominal  GI exam deferred       Other Findings            Anesthetic Plan    ASA: 2  Anesthesia type: general          Induction: Intravenous  Anesthetic plan and risks discussed with: Patient

## 2017-08-21 NOTE — PERIOP NOTES
1746-Handoff Report from Operating Room to PACU    Report received from [address] and Leah Turk CRNA regarding [employer]. Surgeon(s):  Panchito Madrigal MD  And Procedure(s) (LRB):  HYSTEROSCOPY DILITATION  [address] (N/A)  confirmed   with allergies and dressings discussed. Anesthesia type, drugs, patient history, complications, estimated blood loss, vital signs, intake and output, and last pain medication, lines and temperature were reviewed. [address] from Nurse    The following personal items are in your possession at time of discharge:    Dental Appliances: None  Visual Aid: Glasses (reading)     Home Medications: None  Jewelry: None  Clothing: Other (comment) (street clothes)  Other Valuables: Eyeglasses (in personal belongings bag)  Personal Items Sent to Safe: none          PATIENT INSTRUCTIONS:    After general anesthesia or intravenous sedation, for 24 hours or while taking prescription Narcotics:  · Limit your activities  · Do not drive and operate hazardous machinery  · Do not make important personal or business decisions  · Do  not drink alcoholic beverages  · If you have not urinated within 8 hours after discharge, please contact your surgeon on call. Report the following to your surgeon:  · Excessive pain, swelling, redness or odor of or around the surgical area  · Temperature over 100.5  · Nausea and vomiting lasting longer than 4 hours or if unable to take medications  · Any signs of decreased circulation or nerve impairment to extremity: change in color, persistent  numbness, tingling, coldness or increase pain  · Any questions        These are general instructions for a healthy lifestyle:    No smoking/ No tobacco products/ Avoid exposure to second hand smoke    Surgeon General's Warning:  Quitting smoking now greatly reduces serious risk to your health.     Obesity, smoking, and sedentary lifestyle greatly increases your risk for illness    A healthy diet, regular physical exercise & weight monitoring are important for maintaining a healthy lifestyle    You may be retaining fluid if you have a history of heart failure or if you experience any of the following symptoms:  Weight gain of 3 pounds or more overnight or 5 pounds in a week, increased swelling in our hands or feet or shortness of breath while lying flat in bed. Please call your doctor as soon as you notice any of these symptoms; do not wait until your next office visit. Recognize signs and symptoms of STROKE:    F-face looks uneven    A-arms unable to move or move unevenly    S-speech slurred or non-existent    T-time-call 911 as soon as signs and symptoms begin-DO NOT go       Back to bed or wait to see if you get better-TIME IS BRAIN. Warning Signs of HEART ATTACK     Call 911 if you have these symptoms:   Chest discomfort. Most heart attacks involve discomfort in the center of the chest that lasts more than a few minutes, or that goes away and comes back. It can feel like uncomfortable pressure, squeezing, fullness, or pain.  Discomfort in other areas of the upper body. Symptoms can include pain or discomfort in one or both arms, the back, neck, jaw, or stomach.  Shortness of breath with or without chest discomfort.  Other signs may include breaking out in a cold sweat, nausea, or lightheadedness. Don't wait more than five minutes to call [address]! Fast action can save your life. Calling 911 is almost always the fastest way to get lifesaving treatment. Emergency Medical Services staff can begin treatment when they arrive  up to an hour sooner than if someone gets to the hospital by car. The discharge information has been reviewed with the patient and spouse. The patient and spouse verbalized understanding. Discharge medications reviewed with the patient and spouse and appropriate educational materials and side effects teaching were provided.

## 2017-08-21 NOTE — IP AVS SNAPSHOT
[address] [facility]. 
[phone] Patient: Kinga Roberts MRN: [id number] :1960 You are allergic to the following Allergen Reactions Pcn (Penicillins) Rash Recent Documentation Height Weight BMI OB Status Smoking Status 1.651 m 75 kg 27.51 kg/m2 Perimenopausal Never Smoker Emergency Contacts Name Discharge Info Relation Home Work Mobile [facility] DISCHARGE CAREGIVER [3] Spouse [3] [phone] About your hospitalization You were admitted on:  2017 You last received care in the:  [facility] PACU You were discharged on:  2017 Unit phone number:  [phone] Why you were hospitalized Your primary diagnosis was:  Not on File Providers Seen During Your Hospitalizations Provider Role Specialty Primary office phone Panchito Madrigal MD Attending Provider Gynecology [phone] Your Primary Care Physician (PCP) Primary Care Physician Office Phone Office Fax UNKNOWN, PROVIDER ** None ** ** None ** Follow-up Information Follow up With Details Comments Contact Info Provider Unknown   Patient not available to ask Your Appointments 2017  2:00 PM EDT SURGICAL FOLLOW UP with Panchito Madrigal MD  
[facility] Surgical [address] [address] [phone] Current Discharge Medication List  
  
START taking these medications Dose & Instructions Dispensing Information Comments Morning Noon Evening Bedtime  
 oxyCODONE-acetaminophen 7.5-325 mg per tablet Commonly known as:  PERCOCET 7.5 Your last dose was: Your next dose is:    
   
   
 Dose:  1 Tab Take 1 Tab by mouth every four (4) hours as needed for Pain. Max Daily Amount: 6 Tabs. Quantity:  30 Tab Refills:  0 Where to Get Your Medications Information on where to get these meds will be given to you by the nurse or doctor. ! Ask your nurse or doctor about these medications  
  oxyCODONE-acetaminophen 7.5-325 mg per tablet Discharge Instructions Patient Discharge Instructions Kinga Roberts / [id number] : 1960 Admitted 2017 Discharged: 2017 Take Home Medications · It is important that you take the medication exactly as they are prescribed. · Keep your medication in the bottles provided by the pharmacist and keep a list of the medication names, dosages, and times to be taken in your wallet. · Do not take other medications without consulting your doctor. No driving while taking pain medication. What to do at [facility] Recommended diet: Soft diet until normal bowel movements. No soft drinks. Drink  water frequently (Please). Minimal coffee and minimal tea. Recommended activity: Activity as tolerated and you may drive after 2 day(s). No heavy lifting until released to do so by Dr. Josh Francis. No sex, tampons, or douches for 3 weeks. Take showers for the next 3 weeks. Please do not stay in bed during the day; be up and about every day, without doing strenuous activity. It is very important that you not cross your legs, as it will make you more likely to have a blood clot in your leg (DVT), which can travel to your lungs and cause suffocation (pulmonary embolism or PE). Incisions: Do not apply lotion, powder or creams to incisions unless instructed to do so by Dr. Josh Francis. Keep incisions as dry as possible, but it is okay to shower without scrubbing the incisions.  
 
If you experience any of the following symptoms: Heavy vaginal bleeding or temperature above 100.6 degrees, chest pain, or shortness of breath, please urgently follow up with Dr. Josh Francis by calling [phone] or calling answering service at [phone] to have him paged for an emergency. Follow-up appointment: in 2 weeks. Call [phone] to make appointment. Thank you for allowing me to take care of you!!  
 
JUAN Donohue. Information obtained by : 
I understand that if any problems occur once I am at home I am to contact my physician. I understand and acknowledge receipt of the instructions indicated above. Physician's or R.N.'s Signature                                                                  Date/Time Patient or Representative Signature                                                          Date/Time Oxycodone/Acetaminophen (Percocet, Roxicet) - (By mouth) Why this medicine is used:  
Treats pain. This medicine contains a narcotic pain reliever. Contact a nurse or doctor right away if you have: 
· Extreme weakness, shallow breathing, slow heartbeat · Sweating or cold, clammy skin · Skin blisters, rash, or peeling Common side effects: 
· Constipation · Nausea, vomiting · Tiredness © 2017 [facility] Information is for End User's use only and may not be sold, redistributed or otherwise used for commercial purposes. DISCHARGE SUMMARY from Nurse The following personal items are in your possession at time of discharge: 
 
Dental Appliances: None Visual Aid: Glasses (reading) Home Medications: None Jewelry: None Clothing: Other (comment) (street clothes) Other Valuables: Eyeglasses (in personal belongings bag) Personal Items Sent to Safe: none PATIENT INSTRUCTIONS: 
 
After general anesthesia or intravenous sedation, for 24 hours or while taking prescription Narcotics: · Limit your activities · Do not drive and operate hazardous machinery · Do not make important personal or business decisions · Do  not drink alcoholic beverages · If you have not urinated within 8 hours after discharge, please contact your surgeon on call. Report the following to your surgeon: 
· Excessive pain, swelling, redness or odor of or around the surgical area · Temperature over 100.5 · Nausea and vomiting lasting longer than 4 hours or if unable to take medications · Any signs of decreased circulation or nerve impairment to extremity: change in color, persistent  numbness, tingling, coldness or increase pain · Any questions These are general instructions for a healthy lifestyle: No smoking/ No tobacco products/ Avoid exposure to second hand smoke Surgeon General's Warning:  Quitting smoking now greatly reduces serious risk to your health. Obesity, smoking, and sedentary lifestyle greatly increases your risk for illness A healthy diet, regular physical exercise & weight monitoring are important for maintaining a healthy lifestyle You may be retaining fluid if you have a history of heart failure or if you experience any of the following symptoms:  Weight gain of 3 pounds or more overnight or 5 pounds in a week, increased swelling in our hands or feet or shortness of breath while lying flat in bed. Please call your doctor as soon as you notice any of these symptoms; do not wait until your next office visit. Recognize signs and symptoms of STROKE: 
 
F-face looks uneven A-arms unable to move or move unevenly S-speech slurred or non-existent T-time-call 911 as soon as signs and symptoms begin-DO NOT go Back to bed or wait to see if you get better-TIME IS BRAIN. Warning Signs of HEART ATTACK Call 911 if you have these symptoms: 
? Chest discomfort.  Most heart attacks involve discomfort in the center of the chest that lasts more than a few minutes, or that goes away and comes back. It can feel like uncomfortable pressure, squeezing, fullness, or pain. ? Discomfort in other areas of the upper body. Symptoms can include pain or discomfort in one or both arms, the back, neck, jaw, or stomach. ? Shortness of breath with or without chest discomfort. ? Other signs may include breaking out in a cold sweat, nausea, or lightheadedness. Don't wait more than five minutes to call [address]! Fast action can save your life. Calling 911 is almost always the fastest way to get lifesaving treatment. Emergency Medical Services staff can begin treatment when they arrive  up to an hour sooner than if someone gets to the hospital by car. Discharge Orders None Introducing [facility]! Bhumi Paiz introduces [employer] patient portal. Now you can access parts of your medical record, email your doctor's office, and request medication refills online. 1. In your internet browser, go to [email or web address] 2. Click on the First Time User? Click Here link in the Sign In box. You will see the New Member Sign Up page. 3. Enter your [employer] Access Code exactly as it appears below. You will not need to use this code after youve completed the sign-up process. If you do not sign up before the expiration date, you must request a new code. · [employer] Access Code: [email or web address] Expires: 10/31/2017  3:05 PM 
 
4. Enter the last four digits of your Social Security Number (xxxx) and Date of Birth (mm/dd/yyyy) as indicated and click Submit. You will be taken to the next sign-up page. 5. Create a [employer] ID. This will be your [employer] login ID and cannot be changed, so think of one that is secure and easy to remember. 6. Create a [employer] password. You can change your password at any time. 7. Enter your Password Reset Question and Answer.  This can be used at a later time if you forget your password. 8. Enter your e-mail address. You will receive e-mail notification when new information is available in [address] 9. Click Sign Up. You can now view and download portions of your medical record. 10. Click the Download Summary menu link to download a portable copy of your medical information. If you have questions, please visit the Frequently Asked Questions section of the [employer] website. Remember, [employer] is NOT to be used for urgent needs. For medical emergencies, dial 911. Now available from your iPhone and Android! General Information Please provide this summary of care documentation to your next provider. Patient Signature:  ____________________________________________________________ Date:  ____________________________________________________________  
  
[facility] Signature:  ____________________________________________________________ Date:  ____________________________________________________________

## 2017-09-01 ENCOUNTER — TELEPHONE (OUTPATIENT)
Dept: SURGERY | Age: 57
End: 2017-09-01

## 2017-09-01 ENCOUNTER — OFFICE VISIT (OUTPATIENT)
Dept: SURGERY | Age: 57
End: 2017-09-01

## 2017-09-01 VITALS
RESPIRATION RATE: 20 BRPM | HEIGHT: 65 IN | OXYGEN SATURATION: 96 % | SYSTOLIC BLOOD PRESSURE: 128 MMHG | DIASTOLIC BLOOD PRESSURE: 81 MMHG | BODY MASS INDEX: 28.06 KG/M2 | HEART RATE: 75 BPM | TEMPERATURE: 97.9 F | WEIGHT: 168.4 LBS

## 2017-09-01 DIAGNOSIS — Z09 POSTOP CHECK: Primary | ICD-10-CM

## 2017-09-01 DIAGNOSIS — R93.89 ENDOMETRIAL THICKENING ON ULTRA SOUND: ICD-10-CM

## 2017-09-01 RX ORDER — MEDROXYPROGESTERONE ACETATE 10 MG/1
TABLET ORAL
Qty: 30 TAB | Refills: 4 | Status: SHIPPED | OUTPATIENT
Start: 2017-09-01 | End: 2017-09-29

## 2017-09-01 NOTE — TELEPHONE ENCOUNTER
Pt wants to know if she is supposed to start the rx you gave her today since it is the beginning of the month. Please review and advise.     [phone]

## 2017-09-01 NOTE — PROGRESS NOTES
SUBJECTIVE:     Marquis Botello is a 49 y.o.  female presents for postop care following:     Patient's last menstrual period was 10/14/2015. The patient is not having any pain. Anila Peaks Appetite is good. Eating a regular diet without difficulty. Bowel movements are regular. The patient is voiding without difficulty. DATE OF PROCEDURE: 8/21/2017     PREOPERATIVE DIAGNOSIS: ENDOMETRIAL THICKENING      POSTOPERATIVE DIAGNOSIS: LARGE ENDOMETRIAL POLYP VS LARGE INTRACAVITARY FIBROID.      PROCEDURE: Hysteroscopy, Dilatation and Myosure Curettage with endometrial polypectomy vs myomectomy (pending path report).     SURGEON:               Paige Han MD     ASSISTANT:  None     ANESTHESIA: General     EBL:Minimal cc     FLUID DEFICIT: 430 cc     FINDINGS: Large intracavitary vs large endometrial polyp (pending path report) emanating from a stalk on right lower uterine segment, posteriorly. Rest of cavity was normal.      Path report noted: FINAL PATHOLOGIC DIAGNOSIS   Endometrium, curetting/shavings:   Benign endometrial polyp (multiple fragments)   Fragments of smooth muscle, some suggestive of submucosal leiomyoma       ROS: Constitutional: No weight change, chills or fever, anorexia, weakness or sleep disturbance . Cardiovascular: No chest pain, shortness of breath, or palpitations . Respiratory: No cough, shortness of breath, hemoptysis, or orthopnea . Neurologic: No syncope, headaches or seizures . Hematologic: No easy bruising or unusual bleeding . Psychiatric: No insomnia, confusion, depression, or anxiety . GI:No nausea and vomiting, diarrhea or constipation  . : See HPI . Musculoskeletal: No joint pain or muscle pain . Endocrine: No polydipsia, polyuria, cold intolerance, excessive fatigue, or sleep disturbance . Integumentary: No breast pain, lumps, nipple discharge, or axillary lumps . OBJECTIVE:    There were no vitals taken for this visit.     General: alert, cooperative, no distress, appears stated age  Abdomen: soft, bowel sounds active, non-tender  Incision:  healing well, no drainage, no erythema, no hernia, no seroma, no swelling, no dehiscence, incision well approximated  Back: CVA tenderness absent  Pelvic: Pelvic exam: examination not indicated     ASSESSMENT:      ICD-10-CM ICD-9-CM    1. Postop check Z09 V67.00    2. Endometrial thickening on ultra sound R93.8 793.5 medroxyPROGESTERone (PROVERA) 10 mg tablet       PLAN:    Follow-up Disposition:  Return in about 4 weeks (around 9/29/2017), or if symptoms worsen or fail to improve.

## 2017-09-01 NOTE — PROGRESS NOTES
Chief Complaint   Patient presents with    Surgical Follow-up     1. Have you been to the ER, urgent care clinic since your last visit? Hospitalized since your last visit? No    2. Have you seen or consulted any other health care providers outside of the [address] [place] [address] since your last visit? Include any pap smears or colon screening.  No

## 2017-09-01 NOTE — MR AVS SNAPSHOT
Visit Information Date & Time Provider Department Dept. Phone Encounter #  
 9/1/2017  2:00 PM Jerry Ortiz, [facility] [phone] [id number] Follow-up Instructions Return in about 4 weeks (around 9/29/2017), or if symptoms worsen or fail to improve. Upcoming Health Maintenance Date Due Hepatitis C Screening 1960 DTaP/Tdap/Td series (1 - Tdap) 12/25/1983 PAP AKA CERVICAL CYTOLOGY 12/25/1983 BREAST CANCER SCRN MAMMOGRAM 12/25/2012 FOBT Q 1 YEAR AGE 50-75 12/25/2012 INFLUENZA AGE 9 TO ADULT 8/1/2017 Allergies as of 9/1/2017  Review Complete On: 9/1/2017 By: Kimmy Albarado RN Severity Noted Reaction Type Reactions Pcn [Penicillins]  08/02/2017    Rash Current Immunizations  Never Reviewed No immunizations on file. Not reviewed this visit You Were Diagnosed With   
  
 Codes Comments Postop check    -  Primary ICD-10-CM: J96 ICD-9-CM: V67.00 Endometrial thickening on ultra sound     ICD-10-CM: R93.8 ICD-9-CM: 793.5 Vitals BP Pulse Temp Resp Height(growth percentile) Weight(growth percentile) 128/81 (BP 1 Location: Right arm, BP Patient Position: Sitting) 75 97.9 °F (36.6 °C) (Oral) 20 5' 5\" (1.651 m) 168 lb 6.4 oz (76.4 kg) LMP SpO2 BMI OB Status Smoking Status 10/14/2015 96% 28.02 kg/m2 Perimenopausal Never Smoker BMI and BSA Data Body Mass Index Body Surface Area 28.02 kg/m 2 1.87 m 2 Preferred Pharmacy Pharmacy Name Phone [facility] [address] [facility], [address] [place] [address] AT [address] [place] [address], [facility] [phone] Your Updated Medication List  
  
   
This list is accurate as of: 9/1/17  2:32 PM.  Always use your most recent med list.  
  
  
  
  
 medroxyPROGESTERone 10 mg tablet Commonly known as:  PROVERA Take 1 tablet daily for the 1st 10 days of every month. oxyCODONE-acetaminophen 7.5-325 mg per tablet Commonly known as:  PERCOCET 7.5 Take 1 Tab by mouth every four (4) hours as needed for Pain. Max Daily Amount: 6 Tabs. Prescriptions Sent to Pharmacy Refills  
 medroxyPROGESTERone (PROVERA) 10 mg tablet 4 Sig: Take 1 tablet daily for the 1st 10 days of every month. Class: Normal  
 Pharmacy: [facility] [address] [place] [address], [address] [place] [address] [facility] #: [phone] Follow-up Instructions Return in about 4 weeks (around 9/29/2017), or if symptoms worsen or fail to improve. Introducing hospitals & HEALTH SERVICES! Priyank Geiger introduces [employer] patient portal. Now you can access parts of your medical record, email your doctor's office, and request medication refills online. 1. In your internet browser, go to [email or web address] 2. Click on the First Time User? Click Here link in the Sign In box. You will see the New Member Sign Up page. 3. Enter your [employer] Access Code exactly as it appears below. You will not need to use this code after youve completed the sign-up process. If you do not sign up before the expiration date, you must request a new code. · [employer] Access Code: [email or web address] Expires: 10/31/2017  3:05 PM 
 
4. Enter the last four digits of your Social Security Number (xxxx) and Date of Birth (mm/dd/yyyy) as indicated and click Submit. You will be taken to the next sign-up page. 5. Create a [employer]sec [employer] ID. This will be your [employer] login ID and cannot be changed, so think of one that is secure and easy to remember. 6. Create a [employer] password. You can change your password at any time. 7. Enter your Password Reset Question and Answer. This can be used at a later time if you forget your password. 8. Enter your e-mail address. You will receive e-mail notification when new information is available in [address] 9. Click Sign Up. You can now view and download portions of your medical record. 10. Click the Download Summary menu link to download a portable copy of your medical information. If you have questions, please visit the Frequently Asked Questions section of the [employer] website. Remember, [employer] is NOT to be used for urgent needs. For medical emergencies, dial 911. Now available from your iPhone and Android! Please provide this summary of care documentation to your next provider. Your primary care clinician is listed as PROVIDER UNKNOWN. If you have any questions after today's visit, please call [phone].

## 2017-09-29 ENCOUNTER — OFFICE VISIT (OUTPATIENT)
Dept: SURGERY | Age: 57
End: 2017-09-29

## 2017-09-29 VITALS
OXYGEN SATURATION: 96 % | BODY MASS INDEX: 27.92 KG/M2 | TEMPERATURE: 97.9 F | DIASTOLIC BLOOD PRESSURE: 79 MMHG | WEIGHT: 167.6 LBS | HEART RATE: 70 BPM | SYSTOLIC BLOOD PRESSURE: 120 MMHG | HEIGHT: 65 IN

## 2017-09-29 DIAGNOSIS — N83.201 RIGHT OVARIAN CYST: ICD-10-CM

## 2017-09-29 DIAGNOSIS — Z09 POSTOP CHECK: Primary | ICD-10-CM

## 2017-09-29 NOTE — MR AVS SNAPSHOT
Visit Information Date & Time Provider Department Dept. Phone Encounter #  
 9/29/2017  3:15 PM Bell Perry, [address] [address] [id number] Follow-up Instructions Return in about 6 months (around 3/29/2018), or if symptoms worsen or fail to improve. Upcoming Health Maintenance Date Due Hepatitis C Screening 1960 DTaP/Tdap/Td series (1 - Tdap) 12/25/1983 BREAST CANCER SCRN MAMMOGRAM 12/25/2012 FOBT Q 1 YEAR AGE 50-75 12/25/2012 INFLUENZA AGE 9 TO ADULT 8/1/2017 PAP AKA CERVICAL CYTOLOGY 10/1/2017* *Topic was postponed. The date shown is not the original due date. Allergies as of 9/29/2017  Review Complete On: 9/29/2017 By: Bell Perry MD  
  
 Severity Noted Reaction Type Reactions Pcn [Penicillins]  08/02/2017    Rash Current Immunizations  Never Reviewed No immunizations on file. Not reviewed this visit You Were Diagnosed With   
  
 Codes Comments Postop check    -  Primary ICD-10-CM: Q68 ICD-9-CM: V67.00 Right ovarian cyst     ICD-10-CM: M36.637 ICD-9-CM: 620.2 Vitals BP Pulse Temp Height(growth percentile) Weight(growth percentile) SpO2  
 120/79 70 97.9 °F (36.6 °C) (Oral) 5' 5\" (1.651 m) 167 lb 9.6 oz (76 kg) 96% BMI OB Status Smoking Status 27.89 kg/m2 Perimenopausal Never Smoker Vitals History BMI and BSA Data Body Mass Index Body Surface Area  
 27.89 kg/m 2 1.87 m 2 Preferred Pharmacy [facility] [address] [facility], [address] AT [address] [place] [address] [phone] Your Updated Medication List  
  
Notice  As of 9/29/2017  3:26 PM  
 You have not been prescribed any medications. Follow-up Instructions Return in about 6 months (around 3/29/2018), or if symptoms worsen or fail to improve. Introducing [address]! Mary Ross introduces [employer] patient portal. Now you can access parts of your medical record, email your doctor's office, and request medication refills online. 1. In your internet browser, go to [email or web address] 2. Click on the First Time User? Click Here link in the Sign In box. You will see the New Member Sign Up page. 3. Enter your [employer] Access Code exactly as it appears below. You will not need to use this code after youve completed the sign-up process. If you do not sign up before the expiration date, you must request a new code. · [employer] Access Code: [email or web address] Expires: 10/31/2017  3:05 PM 
 
4. Enter the last four digits of your Social Security Number (xxxx) and Date of Birth (mm/dd/yyyy) as indicated and click Submit. You will be taken to the next sign-up page. 5. Create a [employer] ID. This will be your [employer] login ID and cannot be changed, so think of one that is secure and easy to remember. 6. Create a [employer] password. You can change your password at any time. 7. Enter your Password Reset Question and Answer. This can be used at a later time if you forget your password. 8. Enter your e-mail address. You will receive e-mail notification when new information is available in [address] 9. Click Sign Up. You can now view and download portions of your medical record. 10. Click the Download Summary menu link to download a portable copy of your medical information. If you have questions, please visit the Frequently Asked Questions section of the [employer] website. Remember, [employer] is NOT to be used for urgent needs. For medical emergencies, dial 911. Now available from your iPhone and Android! Please provide this summary of care documentation to your next provider. Your primary care clinician is listed as PROVIDER UNKNOWN. If you have any questions after today's visit, please call [phone].

## 2017-09-29 NOTE — PROGRESS NOTES
SUBJECTIVE:     Angel Ernst is a 49 y.o.  female presents for postop care following:     No LMP recorded. Patient is not currently having periods (Reason: Perimenopausal). The patient is not having any pain. Jerrell Irwin Appetite is good. Eating a regular diet without difficulty. Bowel movements are regular. The patient is voiding without difficulty. DATE OF PROCEDURE: 8/21/2017     PREOPERATIVE DIAGNOSIS: ENDOMETRIAL THICKENING      POSTOPERATIVE DIAGNOSIS: LARGE ENDOMETRIAL POLYP VS LARGE INTRACAVITARY FIBROID.      PROCEDURE: Hysteroscopy, Dilatation and Myosure Curettage with endometrial polypectomy vs myomectomy (pending path report).     SURGEON:               Lyn Thomas MD     ASSISTANT:  None     ANESTHESIA: General     EBL:Minimal cc     FLUID DEFICIT: 430 cc     FINDINGS: Large intracavitary vs large endometrial polyp (pending path report) emanating from a stalk on right lower uterine segment, posteriorly. Rest of cavity was normal.      Path report noted: FINAL PATHOLOGIC DIAGNOSIS   Endometrium, curetting/shavings:   Benign endometrial polyp (multiple fragments)   Fragments of smooth muscle, some suggestive of submucosal leiomyoma       ROS: Constitutional: No weight change, chills or fever, anorexia, weakness or sleep disturbance . Cardiovascular: No chest pain, shortness of breath, or palpitations . Respiratory: No cough, shortness of breath, hemoptysis, or orthopnea . Neurologic: No syncope, headaches or seizures . Hematologic: No easy bruising or unusual bleeding . Psychiatric: No insomnia, confusion, depression, or anxiety . GI:No nausea and vomiting, diarrhea or constipation  . : See HPI . Musculoskeletal: No joint pain or muscle pain . Endocrine: No polydipsia, polyuria, cold intolerance, excessive fatigue, or sleep disturbance . Integumentary: No breast pain, lumps, nipple discharge, or axillary lumps .     OBJECTIVE:    Visit Vitals    /79    Pulse 70    Temp 97.9 °F (36.6 °C) (Oral)    Ht 5' 5\" (1.651 m)    Wt 167 lb 9.6 oz (76 kg)    SpO2 96%    BMI 27.89 kg/m2       General: alert, cooperative, no distress, appears stated age  Abdomen: soft, bowel sounds active, non-tender  Incision:  healing well, no drainage, no erythema, no hernia, no seroma, no swelling, no dehiscence, incision well approximated  Back: CVA tenderness absent  Pelvic: Pelvic exam: VULVA: normal appearing vulva with no masses, tenderness or lesions, VAGINA: normal appearing vagina with normal color and discharge, no lesions, CERVIX: normal appearing cervix without discharge or lesions, UTERUS: uterus is normal size, shape, consistency and nontender, ADNEXA: normal adnexa in size, nontender and no masses. ASSESSMENT:      ICD-10-CM ICD-9-CM    1. Postop check Z09 V67.00    2. Right ovarian cyst N83.201 620.2        PLAN:    Pelvic US to recheck cyst on next visit. Follow-up Disposition:  Return in about 6 months (around 3/29/2018), or if symptoms worsen or fail to improve.

## 2017-10-31 ENCOUNTER — TELEPHONE (OUTPATIENT)
Dept: SURGERY | Age: 57
End: 2017-10-31

## 2017-10-31 NOTE — TELEPHONE ENCOUNTER
Received call from patient stating that she missed her menses cycle this month. Pt states that she is taking medroxyprogesterone the first 10 days of the month, would like to be advised if she should continue taking medication or stop. Please advise.   [phone] (home)

## 2018-03-30 ENCOUNTER — OFFICE VISIT (OUTPATIENT)
Dept: SURGERY | Age: 58
End: 2018-03-30

## 2018-03-30 VITALS
DIASTOLIC BLOOD PRESSURE: 78 MMHG | HEART RATE: 78 BPM | SYSTOLIC BLOOD PRESSURE: 122 MMHG | TEMPERATURE: 97.3 F | OXYGEN SATURATION: 98 % | WEIGHT: 162 LBS | HEIGHT: 65 IN | BODY MASS INDEX: 26.99 KG/M2

## 2018-03-30 DIAGNOSIS — N83.201 RIGHT OVARIAN CYST: ICD-10-CM

## 2018-03-30 DIAGNOSIS — N95.9 MENOPAUSAL AND POSTMENOPAUSAL DISORDER: Primary | ICD-10-CM

## 2018-03-30 RX ORDER — MEDROXYPROGESTERONE ACETATE 10 MG/1
TABLET ORAL
Refills: 3 | COMMUNITY
Start: 2017-12-27

## 2018-03-30 NOTE — PROGRESS NOTES
SUBJECTIVE: Jovan Mercer is a 56 y.o. female who presents with amenorrhea. Symptom onset has been chronic for a time period of 2.5 year(s). No LMP recorded. Patient is not currently having periods (Reason: Perimenopausal). Allergies   Allergen Reactions    Pcn [Penicillins] Rash       ROS: Constitutional: No weight change, chills or fever, anorexia, weakness or sleep disturbance . Cardiovascular: No chest pain, shortness of breath, or palpitations . Respiratory: No cough, shortness of breath, hemoptysis, or orthopnea . Neurologic: No syncope, headaches or seizures . Hematologic: No easy bruising or unusual bleeding . Psychiatric: No insomnia, confusion, depression, or anxiety . GI:No nausea and vomiting, diarrhea or constipation  . : See HPI . Musculoskeletal: No joint pain or muscle pain . Endocrine: No polydipsia, polyuria, cold intolerance, excessive fatigue, or sleep disturbance . Integumentary: No breast pain, lumps, nipple discharge, or axillary lumps . OBJECTIVE:     Visit Vitals    /78    Pulse 78    Temp 97.3 °F (36.3 °C) (Temporal)    Ht 5' 5\" (1.651 m)    Wt 162 lb (73.5 kg)    SpO2 98%    BMI 26.96 kg/m2       General:  alert, cooperative, no distress, appears stated age   Abdomen: soft, non-tender. Bowel sounds normal. No masses,  no organomegaly   Back:  Costovertebral angle tenderness absent   Genitourinary: Pelvic exam: examination not indicated. Extremities:  extremities normal, atraumatic, no cyanosis or edema     ASSESSMENT:      ICD-10-CM ICD-9-CM    1.  Menopausal and postmenopausal disorder N95.9 627.9        Patient Active Problem List   Diagnosis Code    Endometrial thickening on ultra sound R93.8    Right ovarian cyst N83.201    Menopausal and postmenopausal disorder N95.9       Current Outpatient Prescriptions   Medication Sig Dispense Refill    medroxyPROGESTERone (PROVERA) 10 mg tablet TK 1 T PO FOR THE FIRST 10 DAYS OF EVERY MONTH  3        Follow-up Disposition: Not on File

## 2018-03-30 NOTE — MR AVS SNAPSHOT
[address] [address] [phone] Patient: Bethany Toth MRN: [id number] :1960 Visit Information Date & Time Provider Department Dept. Phone Encounter #  
 3/30/2018  3:15 PM Ranjan Cedeno, [facility] [address] [id number] Follow-up Instructions Return if symptoms worsen or fail to improve. Upcoming Health Maintenance Date Due Hepatitis C Screening 1962 DTaP/Tdap/Td series (1 - Tdap) 1983 PAP AKA CERVICAL CYTOLOGY 1983 FOBT Q 1 YEAR AGE 50-75 2012 Influenza Age 5 to Adult 2017 BREAST CANCER SCRN MAMMOGRAM 2020 Allergies as of 3/30/2018  Review Complete On: 3/30/2018 By: Ranjan Cedeno MD  
  
 Severity Noted Reaction Type Reactions Pcn [Penicillins]  2017    Rash Current Immunizations  Never Reviewed No immunizations on file. Not reviewed this visit You Were Diagnosed With   
  
 Codes Comments Menopausal and postmenopausal disorder    -  Primary ICD-10-CM: N95.9 ICD-9-CM: 627.9 Right ovarian cyst     ICD-10-CM: D97.620 ICD-9-CM: 620.2 Vitals BP Pulse Temp Height(growth percentile) Weight(growth percentile) SpO2  
 122/78 78 97.3 °F (36.3 °C) (Temporal) 5' 5\" (1.651 m) 162 lb (73.5 kg) 98% BMI OB Status Smoking Status 26.96 kg/m2 Perimenopausal Never Smoker Vitals History BMI and BSA Data Body Mass Index Body Surface Area  
 26.96 kg/m 2 1.84 m 2 Preferred Pharmacy Pharmacy Name Phone [facility] [address] [facility], [address] [place] [address] AT [address] [place] [address] [phone] Your Updated Medication List  
  
   
This list is accurate as of 3/30/18  3:54 PM.  Always use your most recent med list.  
  
  
  
  
 medroxyPROGESTERone 10 mg tablet Commonly known as:  PROVERA TK 1 T PO FOR THE FIRST 10 DAYS OF EVERY MONTH Follow-up Instructions Return if symptoms worsen or fail to improve. To-Do List   
 03/30/2018 Imaging:  US PELV NON OBS   
  
 03/30/2018 Imaging:  US TRANSVAGINAL Introducing [facility] SERVICES! Elena Blanco introduces [employer] patient portal. Now you can access parts of your medical record, email your doctor's office, and request medication refills online. 1. In your internet browser, go to [email or web address] 2. Click on the First Time User? Click Here link in the Sign In box. You will see the New Member Sign Up page. 3. Enter your [employer] Access Code exactly as it appears below. You will not need to use this code after youve completed the sign-up process. If you do not sign up before the expiration date, you must request a new code. · [employer] Access Code: [facility] Expires: 6/28/2018  3:54 PM 
 
4. Enter the last four digits of your Social Security Number (xxxx) and Date of Birth (mm/dd/yyyy) as indicated and click Submit. You will be taken to the next sign-up page. 5. Create a [employer] ID. This will be your [employer] login ID and cannot be changed, so think of one that is secure and easy to remember. 6. Create a [employer] password. You can change your password at any time. 7. Enter your Password Reset Question and Answer. This can be used at a later time if you forget your password. 8. Enter your e-mail address. You will receive e-mail notification when new information is available in [address] 9. Click Sign Up. You can now view and download portions of your medical record. 10. Click the Download Summary menu link to download a portable copy of your medical information. If you have questions, please visit the Frequently Asked Questions section of the [employer] website. Remember, [employer] is NOT to be used for urgent needs. For medical emergencies, dial 911. Now available from your iPhone and Android! Please provide this summary of care documentation to your next provider. Your primary care clinician is listed as PROVIDER UNKNOWN. If you have any questions after today's visit, please call [phone].

## 2018-05-30 ENCOUNTER — HOSPITAL ENCOUNTER (OUTPATIENT)
Dept: ULTRASOUND IMAGING | Age: 58
Discharge: HOME | End: 2018-05-30
Attending: OBSTETRICS & GYNECOLOGY
Payer: COMMERCIAL

## 2018-05-30 DIAGNOSIS — N83.201 RIGHT OVARIAN CYST: ICD-10-CM

## 2018-05-30 PROCEDURE — 76830 TRANSVAGINAL US NON-OB: CPT

## 2018-05-30 PROCEDURE — 76856 US EXAM PELVIC COMPLETE: CPT

## (undated) DEVICE — CYSTOSCOPY PACK: Brand: CONVERTORS

## (undated) DEVICE — Device

## (undated) DEVICE — TUBE ST FLD AQUILEX OUTFLO --

## (undated) DEVICE — SOLUTION IRRIG 3000ML 0.9% SOD CHL FLX CONT 0797208] ICU MEDICAL INC]

## (undated) DEVICE — HANDLE LT SNAP ON ULT DURABLE LENS FOR TRUMPF ALC DISPOSABLE

## (undated) DEVICE — (D)DEVICE TISS REMOVAL -- SOLD AS BX/3 USE ITEM 335978

## (undated) DEVICE — DEVON™ KNEE AND BODY STRAP 60" X 3" (1.5 M X 7.6 CM): Brand: DEVON

## (undated) DEVICE — BAG COLLECTION FLD OR-TBL NS --

## (undated) DEVICE — DRAPE,REIN 53X77,STERILE: Brand: MEDLINE

## (undated) DEVICE — SOLUTION SCRB 4OZ 10% PVP I POVIDONE IOD TOP PAINT EXIDINE

## (undated) DEVICE — KENDALL SCD EXPRESS SLEEVES, KNEE LENGTH, MEDIUM: Brand: KENDALL SCD

## (undated) DEVICE — STERILE POLYISOPRENE POWDER-FREE SURGICAL GLOVES: Brand: PROTEXIS

## (undated) DEVICE — 1200 GUARD II KIT W/5MM TUBE W/O VAC TUBE: Brand: GUARDIAN

## (undated) DEVICE — CATH URETH INTMIT ROB 12FR FUN -- CONVERT TO ITEM 151713

## (undated) DEVICE — REM POLYHESIVE ADULT PATIENT RETURN ELECTRODE: Brand: VALLEYLAB

## (undated) DEVICE — PAD SANIT NPKN 4IN GRD

## (undated) DEVICE — PREP PAD BNS: Brand: CONVERTORS

## (undated) DEVICE — SET SEALS HYSTEROSCOPE DISP -- MYOSURE  EA=10

## (undated) DEVICE — GOWN,SIRUS,NONRNF,SETINSLV,XL,20/CS: Brand: MEDLINE

## (undated) DEVICE — TUBE ST FLD CTRL AQUILEX INFLO --

## (undated) DEVICE — INFECTION CONTROL KIT SYS